# Patient Record
Sex: FEMALE | Race: WHITE | Employment: UNEMPLOYED | URBAN - METROPOLITAN AREA
[De-identification: names, ages, dates, MRNs, and addresses within clinical notes are randomized per-mention and may not be internally consistent; named-entity substitution may affect disease eponyms.]

---

## 2020-10-19 ENCOUNTER — HOSPITAL ENCOUNTER (OUTPATIENT)
Dept: GENERAL RADIOLOGY | Age: 50
Discharge: HOME | End: 2020-10-19

## 2020-10-19 ENCOUNTER — TRANSCRIBE ORDER (OUTPATIENT)
Dept: REGISTRATION | Age: 50
End: 2020-10-19

## 2020-10-19 DIAGNOSIS — M25.561 RIGHT KNEE PAIN: Primary | ICD-10-CM

## 2020-10-19 DIAGNOSIS — M25.561 RIGHT KNEE PAIN: ICD-10-CM

## 2020-10-19 PROCEDURE — 73560 X-RAY EXAM OF KNEE 1 OR 2: CPT

## 2020-10-19 PROCEDURE — 73565 X-RAY EXAM OF KNEES: CPT

## 2020-11-09 ENCOUNTER — HOSPITAL ENCOUNTER (OUTPATIENT)
Dept: PREADMISSION TESTING | Age: 50
Discharge: HOME | End: 2020-11-09
Payer: COMMERCIAL

## 2020-11-09 PROCEDURE — 87635 SARS-COV-2 COVID-19 AMP PRB: CPT

## 2020-11-10 LAB — SARS-COV-2, COV2NT: NOT DETECTED

## 2020-11-12 ENCOUNTER — ANESTHESIA EVENT (OUTPATIENT)
Dept: SURGERY | Age: 50
End: 2020-11-12
Payer: COMMERCIAL

## 2020-11-13 ENCOUNTER — HOSPITAL ENCOUNTER (OUTPATIENT)
Age: 50
Setting detail: OUTPATIENT SURGERY
Discharge: HOME | End: 2020-11-13
Attending: ORTHOPAEDIC SURGERY | Admitting: ORTHOPAEDIC SURGERY
Payer: COMMERCIAL

## 2020-11-13 ENCOUNTER — ANESTHESIA (OUTPATIENT)
Dept: SURGERY | Age: 50
End: 2020-11-13
Payer: COMMERCIAL

## 2020-11-13 VITALS
WEIGHT: 165.12 LBS | TEMPERATURE: 97.8 F | DIASTOLIC BLOOD PRESSURE: 72 MMHG | BODY MASS INDEX: 30.39 KG/M2 | SYSTOLIC BLOOD PRESSURE: 120 MMHG | HEART RATE: 57 BPM | HEIGHT: 62 IN | RESPIRATION RATE: 14 BRPM | OXYGEN SATURATION: 96 %

## 2020-11-13 LAB — HCG UR QL: NEGATIVE

## 2020-11-13 PROCEDURE — 74011250636 HC RX REV CODE- 250/636: Performed by: ORTHOPAEDIC SURGERY

## 2020-11-13 PROCEDURE — 76210000050 HC AMBSU PH II REC 0.5 TO 1 HR: Performed by: ORTHOPAEDIC SURGERY

## 2020-11-13 PROCEDURE — 77030018835 HC SOL IRR LR ICUM -A: Performed by: ORTHOPAEDIC SURGERY

## 2020-11-13 PROCEDURE — 77030040361 HC SLV COMPR DVT MDII -B

## 2020-11-13 PROCEDURE — 74011000250 HC RX REV CODE- 250: Performed by: ORTHOPAEDIC SURGERY

## 2020-11-13 PROCEDURE — 77030000032 HC CUF TRNQT ZIMM -B: Performed by: ORTHOPAEDIC SURGERY

## 2020-11-13 PROCEDURE — 76060000061 HC AMB SURG ANES 0.5 TO 1 HR: Performed by: ORTHOPAEDIC SURGERY

## 2020-11-13 PROCEDURE — 74011250636 HC RX REV CODE- 250/636: Performed by: ANESTHESIOLOGY

## 2020-11-13 PROCEDURE — 81025 URINE PREGNANCY TEST: CPT

## 2020-11-13 PROCEDURE — 77030008495 HC TBNG ARTHSC IRR CNMD -B: Performed by: ORTHOPAEDIC SURGERY

## 2020-11-13 PROCEDURE — 77030028907 HC WRP KNEE WO BGS SOLM -B

## 2020-11-13 PROCEDURE — 76030000000 HC AMB SURG OR TIME 0.5 TO 1: Performed by: ORTHOPAEDIC SURGERY

## 2020-11-13 PROCEDURE — 76210000034 HC AMBSU PH I REC 0.5 TO 1 HR: Performed by: ORTHOPAEDIC SURGERY

## 2020-11-13 PROCEDURE — 77030002916 HC SUT ETHLN J&J -A: Performed by: ORTHOPAEDIC SURGERY

## 2020-11-13 PROCEDURE — 77030020143 HC AIRWY LARYN INTUB CGAS -A: Performed by: ANESTHESIOLOGY

## 2020-11-13 PROCEDURE — 74011250636 HC RX REV CODE- 250/636: Performed by: NURSE ANESTHETIST, CERTIFIED REGISTERED

## 2020-11-13 PROCEDURE — 2709999900 HC NON-CHARGEABLE SUPPLY: Performed by: ORTHOPAEDIC SURGERY

## 2020-11-13 PROCEDURE — 77030040922 HC BLNKT HYPOTHRM STRY -A

## 2020-11-13 PROCEDURE — 77030006884 HC BLD SHV INCIS S&N -B: Performed by: ORTHOPAEDIC SURGERY

## 2020-11-13 RX ORDER — LIDOCAINE HYDROCHLORIDE 10 MG/ML
0.1 INJECTION, SOLUTION EPIDURAL; INFILTRATION; INTRACAUDAL; PERINEURAL AS NEEDED
Status: DISCONTINUED | OUTPATIENT
Start: 2020-11-13 | End: 2020-11-13 | Stop reason: HOSPADM

## 2020-11-13 RX ORDER — NALOXONE HYDROCHLORIDE 0.4 MG/ML
0.04 INJECTION, SOLUTION INTRAMUSCULAR; INTRAVENOUS; SUBCUTANEOUS
Status: DISCONTINUED | OUTPATIENT
Start: 2020-11-13 | End: 2020-11-13 | Stop reason: HOSPADM

## 2020-11-13 RX ORDER — FENTANYL CITRATE 50 UG/ML
25 INJECTION, SOLUTION INTRAMUSCULAR; INTRAVENOUS
Status: DISCONTINUED | OUTPATIENT
Start: 2020-11-13 | End: 2020-11-13 | Stop reason: HOSPADM

## 2020-11-13 RX ORDER — MIDAZOLAM HYDROCHLORIDE 1 MG/ML
INJECTION, SOLUTION INTRAMUSCULAR; INTRAVENOUS AS NEEDED
Status: DISCONTINUED | OUTPATIENT
Start: 2020-11-13 | End: 2020-11-13 | Stop reason: HOSPADM

## 2020-11-13 RX ORDER — PROPOFOL 10 MG/ML
INJECTION, EMULSION INTRAVENOUS AS NEEDED
Status: DISCONTINUED | OUTPATIENT
Start: 2020-11-13 | End: 2020-11-13 | Stop reason: HOSPADM

## 2020-11-13 RX ORDER — DEXAMETHASONE SODIUM PHOSPHATE 4 MG/ML
INJECTION, SOLUTION INTRA-ARTICULAR; INTRALESIONAL; INTRAMUSCULAR; INTRAVENOUS; SOFT TISSUE AS NEEDED
Status: DISCONTINUED | OUTPATIENT
Start: 2020-11-13 | End: 2020-11-13 | Stop reason: HOSPADM

## 2020-11-13 RX ORDER — HYDROMORPHONE HYDROCHLORIDE 1 MG/ML
.25-1 INJECTION, SOLUTION INTRAMUSCULAR; INTRAVENOUS; SUBCUTANEOUS
Status: DISCONTINUED | OUTPATIENT
Start: 2020-11-13 | End: 2020-11-13 | Stop reason: HOSPADM

## 2020-11-13 RX ORDER — SODIUM CHLORIDE 0.9 % (FLUSH) 0.9 %
5-40 SYRINGE (ML) INJECTION EVERY 8 HOURS
Status: DISCONTINUED | OUTPATIENT
Start: 2020-11-13 | End: 2020-11-13 | Stop reason: HOSPADM

## 2020-11-13 RX ORDER — SODIUM CHLORIDE, SODIUM LACTATE, POTASSIUM CHLORIDE, CALCIUM CHLORIDE 600; 310; 30; 20 MG/100ML; MG/100ML; MG/100ML; MG/100ML
125 INJECTION, SOLUTION INTRAVENOUS CONTINUOUS
Status: DISCONTINUED | OUTPATIENT
Start: 2020-11-13 | End: 2020-11-13 | Stop reason: HOSPADM

## 2020-11-13 RX ORDER — KETOROLAC TROMETHAMINE 30 MG/ML
INJECTION, SOLUTION INTRAMUSCULAR; INTRAVENOUS AS NEEDED
Status: DISCONTINUED | OUTPATIENT
Start: 2020-11-13 | End: 2020-11-13 | Stop reason: HOSPADM

## 2020-11-13 RX ORDER — FLUMAZENIL 0.1 MG/ML
0.2 INJECTION INTRAVENOUS
Status: DISCONTINUED | OUTPATIENT
Start: 2020-11-13 | End: 2020-11-13 | Stop reason: HOSPADM

## 2020-11-13 RX ORDER — SODIUM CHLORIDE 0.9 % (FLUSH) 0.9 %
5-40 SYRINGE (ML) INJECTION AS NEEDED
Status: DISCONTINUED | OUTPATIENT
Start: 2020-11-13 | End: 2020-11-13 | Stop reason: HOSPADM

## 2020-11-13 RX ORDER — ALBUTEROL SULFATE 0.83 MG/ML
2.5 SOLUTION RESPIRATORY (INHALATION) AS NEEDED
Status: DISCONTINUED | OUTPATIENT
Start: 2020-11-13 | End: 2020-11-13 | Stop reason: HOSPADM

## 2020-11-13 RX ORDER — BISMUTH SUBSALICYLATE 262 MG
1 TABLET,CHEWABLE ORAL DAILY
COMMUNITY

## 2020-11-13 RX ORDER — ONDANSETRON 2 MG/ML
INJECTION INTRAMUSCULAR; INTRAVENOUS AS NEEDED
Status: DISCONTINUED | OUTPATIENT
Start: 2020-11-13 | End: 2020-11-13 | Stop reason: HOSPADM

## 2020-11-13 RX ORDER — FENTANYL CITRATE 50 UG/ML
INJECTION, SOLUTION INTRAMUSCULAR; INTRAVENOUS AS NEEDED
Status: DISCONTINUED | OUTPATIENT
Start: 2020-11-13 | End: 2020-11-13 | Stop reason: HOSPADM

## 2020-11-13 RX ORDER — DIPHENHYDRAMINE HYDROCHLORIDE 50 MG/ML
12.5 INJECTION, SOLUTION INTRAMUSCULAR; INTRAVENOUS AS NEEDED
Status: DISCONTINUED | OUTPATIENT
Start: 2020-11-13 | End: 2020-11-13 | Stop reason: HOSPADM

## 2020-11-13 RX ORDER — BUPIVACAINE HYDROCHLORIDE AND EPINEPHRINE 5; 5 MG/ML; UG/ML
INJECTION, SOLUTION EPIDURAL; INTRACAUDAL; PERINEURAL AS NEEDED
Status: DISCONTINUED | OUTPATIENT
Start: 2020-11-13 | End: 2020-11-13 | Stop reason: HOSPADM

## 2020-11-13 RX ORDER — ONDANSETRON 2 MG/ML
4 INJECTION INTRAMUSCULAR; INTRAVENOUS AS NEEDED
Status: DISCONTINUED | OUTPATIENT
Start: 2020-11-13 | End: 2020-11-13 | Stop reason: HOSPADM

## 2020-11-13 RX ADMIN — MIDAZOLAM HYDROCHLORIDE 2 MG: 2 INJECTION, SOLUTION INTRAMUSCULAR; INTRAVENOUS at 09:15

## 2020-11-13 RX ADMIN — FENTANYL CITRATE 50 MCG: 0.05 INJECTION, SOLUTION INTRAMUSCULAR; INTRAVENOUS at 09:40

## 2020-11-13 RX ADMIN — ONDANSETRON HYDROCHLORIDE 4 MG: 2 SOLUTION INTRAMUSCULAR; INTRAVENOUS at 09:43

## 2020-11-13 RX ADMIN — PROPOFOL 30 MG: 10 INJECTION, EMULSION INTRAVENOUS at 09:40

## 2020-11-13 RX ADMIN — HYDROMORPHONE HYDROCHLORIDE 0.5 MG: 1 INJECTION, SOLUTION INTRAMUSCULAR; INTRAVENOUS; SUBCUTANEOUS at 11:08

## 2020-11-13 RX ADMIN — PROPOFOL 20 MG: 10 INJECTION, EMULSION INTRAVENOUS at 09:54

## 2020-11-13 RX ADMIN — CEFAZOLIN SODIUM 2 G: 1 POWDER, FOR SOLUTION INTRAMUSCULAR; INTRAVENOUS at 09:30

## 2020-11-13 RX ADMIN — KETOROLAC TROMETHAMINE 30 MG: 30 INJECTION INTRAMUSCULAR; INTRAVENOUS at 09:50

## 2020-11-13 RX ADMIN — FENTANYL CITRATE 50 MCG: 0.05 INJECTION, SOLUTION INTRAMUSCULAR; INTRAVENOUS at 09:21

## 2020-11-13 RX ADMIN — PROPOFOL 150 MG: 10 INJECTION, EMULSION INTRAVENOUS at 09:22

## 2020-11-13 RX ADMIN — FENTANYL CITRATE 50 MCG: 0.05 INJECTION, SOLUTION INTRAMUSCULAR; INTRAVENOUS at 09:46

## 2020-11-13 RX ADMIN — SODIUM CHLORIDE, SODIUM LACTATE, POTASSIUM CHLORIDE, AND CALCIUM CHLORIDE 125 ML/HR: 600; 310; 30; 20 INJECTION, SOLUTION INTRAVENOUS at 08:37

## 2020-11-13 RX ADMIN — DEXAMETHASONE SODIUM PHOSPHATE 4 MG: 4 INJECTION, SOLUTION INTRAMUSCULAR; INTRAVENOUS at 09:27

## 2020-11-13 NOTE — OP NOTES
[facility] 81  OPERATIVE REPORT    Name:  Salvador Hernadez  MR#:  [id number]  :  1970  ACCOUNT #:  [de-identified]  DATE OF SERVICE:  2020    PREOPERATIVE DIAGNOSES:  1. Right knee lateral meniscus tear. 2.  Right knee chondromalacia. POSTOPERATIVE DIAGNOSES:  1. Right knee lateral meniscus tear. 2.  Right knee chondromalacia. PROCEDURE PERFORMED:  Right knee arthroscopic partial lateral meniscectomy. SURGEON:  Loagn Fairchild MD    ASSISTANTClniya Mention. ANESTHESIA:  General.    COMPLICATIONS:  None. SPECIMENS REMOVED:  None. IMPLANTS:  None. ESTIMATED BLOOD LOSS:  None. DRAINS:  None. PROCEDURE:  The patient was brought to the operating room and given general anesthesia in a supine position on the OR table. A tourniquet and soft roll were applied to the right thigh. The extremity was exsanguinated with an Esmarch and the tourniquet was inflated. The leg was placed into a leg garduno. The contralateral extremity was appropriately positioned, protected, and secured. The right leg was prepped and draped in a sterile fashion with ChloraPrep. A time out was performed. Preoperative prophylactic antibiotic infusion was confirmed. Standard anteromedial and anterolateral portals were created and the knee was examined systematically. The patellofemoral compartment had grade II chondromalacia of the lateral facet of the patella with a central area of trochlear chondromalacia that was grade III and very narrow segment along the central trochlea. The suprapatellar pouch had some hyperemic synovitis. The medial and lateral gutters were normal.  Medial compartment with intact meniscus and cartilage. Lateral compartment with an area of tearing of the anterior horn of the lateral meniscus and grade II chondromalacia of the lateral tibial plateau.   A chondroplasty was performed with a shaver and a partial lateral meniscectomy, removing the tear of the anterior horn.  This was balanced to the body. The remaining meniscus was stable to probing. A chondroplasty was performed along the trochlea and patella from both medial and lateral working portals to remove the unstable articular cartilage. A synovectomy was performed to remove the hyperemic synovitis in the suprapatellar pouch, medial and lateral gutters. The joint was thoroughly irrigated. There were no loose bodies. The portals were closed with nylon. The knee was injected with Marcaine. Sterile dressing was applied. The patient was awakened and returned to recovery in stable condition. Family was notified of her condition.       Chapito Bardales MD      [id number]  D:  11/13/2020 9:57  T:  11/13/2020 14:59  JOB #:  [id number]

## 2020-11-13 NOTE — BRIEF OP NOTE
Brief Postoperative Note    Patient: Avis Joe  YOB: 1970  MRN: [id number]    Date of Procedure: 11/13/2020     Pre-Op Diagnosis: CHONDROMALACIA, RIGHT KNEE, OTHER TEAR OF LATERAL MENISCUS, CURRENT INJURY RIGHT KNEE    Post-Op Diagnosis: Same as preoperative diagnosis.       Procedure(s):  RIGHT KNEE ARTHROSCOPIC PARTIAL LATERAL MENISCECTOMY AND CHONDROPLASTY    Surgeon(s):  Torrey Meneses MD    Surgical Assistant: Surg Asst-1: Danelle MARTINI    Anesthesia: General     Estimated Blood Loss (mL): Minimal    Complications: None    Specimens: * No specimens in log *     Implants: * No implants in log *    Drains: * No LDAs found *    Findings:  CHONDROMALACIA, RIGHT KNEE, OTHER TEAR OF LATERAL MENISCUS, CURRENT INJURY RIGHT KNEE    Electronically Signed by Yokasta Gill MD on 11/13/2020 at 9:54 AM

## 2020-11-13 NOTE — PERIOP NOTES
POST ANESTHESIA CARE    DISCHARGE / TRANSFER NOTE    Siddhartha Brandon was   discharged    via wheel chair     to    private vehicle    . Patient was escorted by   nurse    . Patient verbalized   appreciation and was very pleased with care received  throughout their stay. Patient was discharged in     pleasant mood   Pain at discharge/transfer was      2-3  /10. Discharge, medication and follow-up instructions were verbalized as understood prior to discharge  (if applicable for same-day procedures being discharged.)    All personal belongings have been returned to patient, and patient/family verbally confirm receiving belongings as all present.     Signed: Haleigh GONZALEZN RN-BC

## 2020-11-13 NOTE — DISCHARGE INSTRUCTIONS
SEE DR Nahomy Rios PRE-PRINTED INSTRUCTIONS  (COPY MADE TO PAPER CHART RECORD)    ___________________________________________        DISCHARGE SUMMARY from Your Nurse    PATIENT INSTRUCTIONS:    AFTER ANESTHESIA & SEDATION, and WHILE TAKING PAIN MEDICINE  After general anesthesia / intravenous sedation and the 24 hours following, and/or while taking prescription Opiates:  · Limit your activities  · Do not drive and operate hazardous machinery until you have been of all narcotics and sedatives for over 24 hours  · Do not make important personal or business decisions  · Do  not drink alcoholic beverages  · If you have not urinated within 8 hours after discharge, please contact your surgeon on call. SIGNS OF INFECTION, THINGS TO REPORT TO YOUR DOCTOR  Report the following Signs of Infection or General Problems after surgery to your surgeon:  · Excessive pain, swelling, redness, drainage, pus or odor of or around the surgical area  · Fever/ temperature over 101; Temperature over 100 if on medications (chemotherapy or medicines which affect your ability to fight infections)  · Nausea and vomiting lasting longer than 4 hours or if unable to take medications  · Any signs of decreased circulation or nerve impairment to extremity: change in color, persistent  numbness, tingling, coldness or increase pain  · If you have any questions. GOOD HELP TO FIGHT AN INFECTION  Here are a few tip to help reduce the chance of getting an infection after surgery:   Wash Your Hands   Good handwashing is the most important thing you and your caregiver can do.  Wash before and after caring for any wounds. Dry your hand with a clean towel.  Wash with soap and water for at least 20 seconds. A TIP: sing the \"Happy Birthday\" song through one time while washing to help with the timing.  Use a hand  in between washings.      Shower   When your surgeon says it is OK to take a shower, use a new bar of antibacterial soap (if that is what you use, and keep that bar of soap ONLY for your use), or antibacterial body wash.  Use a clean wash cloth or sponge when you bathe.  Dry off with a clean towel  after every bath - be careful around any wounds, skin staples, sutures or surgical glue over/on wounds.  Do not enter swimming pools, hot tubs, lakes, rivers and/or ocean until wounds are healed and your doctor/surgeon says it is OK.  Use Clean Sheets   Sleep on freshly laundered sheets after your surgery.  Keep the surgery site covered with a clean, dry bandage (if instructed to do so). If the bandage becomes soiled, reapply a new, dry, clean bandage.  Do not allow pets to sleep with you while your wound is healing.  Lifestyle Modification and Controlling Your Blood Sugar   Smoking slows wound healing. Stop smoking and limit exposure to second-hand smoke.  High blood sugar slows wound healing. Eat a well-balanced diet to provide proper nutrition while healing   Monitor your blood sugar (if you are a diabetic) and take your medications as you are suppose to so you can control you blood sugar after surgery. COUGH AND DEEP BREATHE  Breathing deep and coughing are very important exercises to do after surgery. Deep breathing and coughing open the little air tubes and air sacks in your lungs. You take deep breaths every day. You may not even notice - it is just something you do when you sigh or yawn. It is a natural exercise you do to keep these air passages open. After surgery, take deep breaths and cough, on purpose. Coughing and deep breathing help prevent bronchitis and pneumonia after surgery. If you had chest or belly surgery, use a pillow as a \"hug omayra\" and hold it tightly to your chest or belly when you cough. DIRECTIONS:  1. Take 10 to 15 slow deep breaths every hour while awake. 2. Breathe in deeply, and hold it for 2 seconds.   3. Exhale slowly through puckered lips, like blowing up a balloon. 4. After every 4th or 5th deep breath, hug your pillow to your chest or belly and give a hard, deep cough. Yes, it will probably hurt if you had abdominal surgery. But doing this exercise is very important part of healing after surgery. Take your pain medicine to help you do this exercise without too much pain. ANKLE PUMPS    Ankle pumps increase the circulation of oxygenated blood to your lower extremities and decrease your risk for circulation problems such as blood clots. They also stretch the muscles, tendons and ligaments in your foot and ankle, and prevent joint contracture in the ankle and foot, especially after surgeries on the legs. It is important to do ankle pump exercises regularly after surgery because immobility increases your risk for developing a blood clot. Your doctor may also have you take an Aspirin for the next few days as well. If your doctor did not ask you to take an Aspirin, consult with him before starting Aspirin therapy on your own. The exercise is quite simple. · Slowly point your foot forward, feeling the muscles on the top of your lower leg stretch, and hold this position for 5 seconds. · Next, pull your foot back toward you as far as possible, stretching the calf muscles, and hold that position for 5 seconds. · Repeat with the other foot. · Perform 10 repetitions every hour while awake for both ankles if possible (down and then up with the foot once is one repetition). You should feel gentle stretching of the muscles in your lower leg when doing this exercise. If you feel pain, or your range of motion is limited, don't push too hard. Only go the limit your joint and muscles will let you go. If you have increasing pain, progressively worsening leg warmth or swelling, STOP the exercise and call your doctor. Other Wound Care information:  [] No additional recommendations.     Cold Therapy Instructions    Your nurse will provide a cold therapy wrap based upon your surgery, need, and your doctor's orders. INSTRUCTIONS FOR USE:  All cooling wraps produce sustained periods of intense cold. NEVER PLACE PAD ON BARE SKIN OR HAVE CONTACT WITH BARE SKIN  Always Use An Insulating Barrier. Tissue injury can occur if these devices are used improperly. Follow your surgeons instructions carefully regarding the frequency, duration and breaks from cold therapy, and the total length of treatment. Check under the pad barrier every 2 hours for skin injury (see below.)      SIGNS OF SKIN INJURY:  STOP USE AND CONTACT YOUR SURGEON if any of the following occur: Increased pain, burning, increased swelling, itching, blisters, increased redness, discoloration, welts, or other change in skin condition. INDICATIONS:  Back, Hip, Knee or Shoulder Surgery and Post-Operative Treatment; Trauma; Orthopedic Rehabilitation      CONTRAINDICATIONS:  Cold therapy should not be used by persons with Diabetes, Raynaud's or other vasospastic disease, cold hypersensititvity, or compromised local circulation. Certain medical conditions make cold-induced injury more likely. Please consult with your healthcare provider before use. Below is information on the medication(s) your doctor is prescribing for you: The maximum daily dose of acetaminophen was discussed with the patient. She was encouraged not to exceed 3,000 mg of acetaminophen during a 24 hour period and was asked to keep in mind that acetaminophen can also be found in many over-the-counter cold medications as well as narcotics that may be given for pain. The patient expresses understanding of these issues and questions were answered. 4 THINGS ABOUT PAIN MEDICINE I ALWAYS TALK ABOUT:  There are 4 side effects I always talk about for pain medications. 1. They make you sleepy and drowsy.   Do not drive a car or operate machines while taking pain medication. Do not make any major decisions. Take a nap. Relax. Let your body recover from the affects of anesthesia and surgery. 2. Some people have quite a problem with itching and. ..  3. Nausea or vomiting. I mention these together because research tends to suggest there is a gene-related issue. While some have a hard time with these problems, others do not. These are expected and know side effects. Over-the-counter Benadryl® (the drug name is diphenhydramine) can help with the itching. Your doctor may also have given you some medicine for nausea. IF HE DID NOT, CALL HIM/HER. 4. Last but not least is the problem of constipation (not joe able to have a bowel movement - poop.)  All pain medicine can slow down the movement of food through the gut. The slower it goes, the worse it can be. Frankly, this means hard poop adding insult to the injury of surgery. And if you had tummy surgery, like having your gall bladder removed or a hernia repair, YOU DO NOT WANT THIS PROBLEM. There are 4 things I recommend. · Drink lots of fluids. For healthy people with no heart problems, this means at least 64 ounces of liquids or more per day. For example, a Big Gulp® from [employer] is 32 ounces. So you need to drink at least 2  Big Gulp®'s of fluids every day. If you have heart problems you may not be able to do this. Talk to your doctor about what you should do to prevent constipation. · Drinking fruit juice like apple, pear, or prune juice gives you extra \"BANG\" for your beverage. These drinks are high in natural fiber. If you are a diabetic, drink sugar-free fluids with fiber additives (see next 2 points.)  Avoid drinking extra fruit juice unless this is a regular part of your diet plan. · Eat extra fresh fruits and vegetables. · Add extra fiber-products. Fiber products like Metamucil®, Citrucel®, Miralax® or Benefiber® can help.   These products are over-the-counter and you do not need a prescription from your doctor. If you have followed these recommendations and still have some difficulty having a good poop, take and over-the-counter stimulant like Dulcolax® (biscodyl)  or Senokot® (senna concentrate). These may help get things moving. Bro Romero MEDICATION AND   SIDE EFFECT GUIDE    The [facility] [employer] MEDICATION AND SIDE EFFECT GUIDE was provided to the PATIENT AND CARE PROVIDER. Information provided includes instruction about drug purpose and common side effects for the following medications:   · Acetaminophen  · Tramadol  · Zofran    Medication information added to discharge record on November 13, 2020 at 10:24 AM.      Some information we wish all of our patients to be familiar with and General Information for Healthy Lifestyle choices:    · Make a list of your current medications with your Primary Care Provider. · Update this list whenever your medications are discontinued, doses are changed, or new medications (including over-the-counter products like ibuprofen, vitamins, or herbal remedies) are added. · Carry medication information at all times in case of emergency situations      No smoking / No tobacco products / Avoid exposure to second hand smoke    Surgeon General's Warning:  Quitting smoking now greatly reduces serious risk to your health. Obesity, smoking, and sedentary lifestyle greatly increases your risk for illness. A healthy diet, regular physical exercise & weight monitoring are important for maintaining a healthy lifestyle. A Note About Congestive Heart Failure:   You may be retaining fluid if you have a history of heart failure or if you experience any of the following symptoms:      · Weight gain of 3 pounds or more overnight or 5 pounds in a week  · Increased swelling in our hands or feet  · Shortness of breath while lying flat in bed      Please call your doctor as soon as you notice any of these symptoms; do not wait until your next office visit. A Note About Strokes:  Recognize signs and symptoms of STROKE. The simple mnemonic, F.A.S.T., can help you remember signs of a stroke and what to do if you suspect a stroke is occuring to you or someone you are with:    F - Face looks uneven  A - Arms unable to move, or move evenly  S - Speech is slurred or non-existent  T - Time - CALL 911 as soon as signs and symptoms begin - DO NOT go to bed or wait to see if you get better - TIME IS BRAIN. Warning Signs of HEART ATTACK   Call 911 if you have these symptoms:     Chest discomfort. Most heart attacks involve discomfort in the center of the chest that lasts more than a few minutes, or that goes away and comes back. It can feel like uncomfortable pressure, squeezing, fullness, or pain.  Discomfort in other areas of the upper body. Symptoms can include pain or discomfort in one or both arms, the back, neck, jaw, or stomach.  Shortness of breath with or without chest discomfort.  Other signs may include breaking out in a cold sweat, nausea, or lightheadedness. Don't wait more than five minutes to call 911 - MINUTES MATTER! Fast action can save your life. Calling 911 is almost always the fastest way to get lifesaving treatment. Emergency Medical Services staff can begin treatment when they arrive -- up to an hour sooner than if someone gets to the hospital by car. Learning About Coronavirus [phone])  Coronavirus [phone]): Overview  What is coronavirus (COVID-19)? The coronavirus disease (COVID-19) is caused by a virus. It is an illness that was first found in [place], [place], in December 2019. It has since spread worldwide. The virus can cause fever, cough, and trouble breathing. In severe cases, it can cause pneumonia and make it hard to breathe without help. It can cause death. Coronaviruses are a large group of viruses. They cause the common cold.  They also cause more serious illnesses like Middle East respiratory syndrome (MERS) and severe acute respiratory syndrome (SARS). COVID-19 is caused by a novel coronavirus. That means it's a new type that has not been seen in people before. This virus spreads person-to-person through droplets from coughing and sneezing. It can also spread when you are close to someone who is infected. And it can spread when you touch something that has the virus on it, such as a doorknob or a tabletop. What can you do to protect yourself from coronavirus (COVID-19)? The best way to protect yourself from getting sick is to:  · Avoid areas where there is an outbreak. · Avoid contact with people who may be infected. · Wash your hands often with soap or alcohol-based hand sanitizers. · Avoid crowds and try to stay at least 6 feet away from other people. · Wash your hands often, especially after you cough or sneeze. Use soap and water, and scrub for at least 20 seconds. If soap and water aren't available, use an alcohol-based hand . · Avoid touching your mouth, nose, and eyes. What can you do to avoid spreading the virus to others? To help avoid spreading the virus to others:  · Cover your mouth with a tissue when you cough or sneeze. Then throw the tissue in the trash. · Use a disinfectant to clean things that you touch often. · Stay home if you are sick or have been exposed to the virus. Don't go to school, work, or public areas. And don't use public transportation. · If you are sick:  ? Leave your home only if you need to get medical care. But call the doctor's office first so they know you're coming. And wear a face mask, if you have one.  ? If you have a face mask, wear it whenever you're around other people. It can help stop the spread of the virus when you cough or sneeze. ? Clean and disinfect your home every day. Use household  and disinfectant wipes or sprays. Take special care to clean things that you grab with your hands.  These include doorknobs, remote controls, phones, and handles on your refrigerator and microwave. And don't forget countertops, tabletops, bathrooms, and computer keyboards. When to call for help  Call 911 anytime you think you may need emergency care. For example, call if:  · You have severe trouble breathing. (You can't talk at all.)  · You have constant chest pain or pressure. · You are severely dizzy or lightheaded. · You are confused or can't think clearly. · Your face and lips have a blue color. · You pass out (lose consciousness) or are very hard to wake up. Call your doctor now if you develop symptoms such as:  · Shortness of breath. · Fever. · Cough. If you need to get care, call ahead to the doctor's office for instructions before you go. Make sure you wear a face mask, if you have one, to prevent exposing other people to the virus. Where can you get the latest information? The following health organizations are tracking and studying this virus. Their websites contain the most up-to-date information. Khadra Hunter also learn what to do if you think you may have been exposed to the virus. · U.[place]. Centers for Disease Control and Prevention (CDC): The CDC provides updated news about the disease and travel advice. The website also tells you how to prevent the spread of infection. www.cdc.gov  · World Health Organization [facility]): WHO offers information about the virus outbreaks. WHO also has travel advice. www.who.int  Current as of: April 1, 2020               Content Version: 12.4  © 2006-2020 [employer]. Care instructions adapted under license by your healthcare professional. If you have questions about a medical condition or this instruction, always ask your healthcare professional. [place] [address] any warranty or liability for your use of this information. AT THE COMPLETION OF DISCHARGE INSTRUCTION REVIEW, WE VERIFY:  The discharge information has been reviewed with the patient and caregiver.     Questions have been asked and answered meeting patient and caregiver expectations. The patient and caregiver verbalized understanding. Your discharge nurse was Warren Rudolph RN-BC       Board Certified - Pain Management      CONTENTS FOUND IN YOUR DISCHARGE ENVELOPE:  [x]     Surgeon and Hospital Discharge Instructions  [x]     [facility] Care Provider Card  [x]     Medication & Side Effect Guide            (your newly prescribed medications have been marked/highlighted showing the most common side effects from the classes of drugs on your prescriptions)  [x]     Medication Prescription(s) x 3 ( [x] These have been sent electronically to your pharmacy by your surgeon,   - OR -       your surgeon has already provided these to you during a previous/pre-op office visit)  []     Physical Therapy Prescription  []     Follow-up Appointment Cards  [x]     Surgery-related Pictures/Media  []     Pain block and/or block with On-Q Catheter from Anesthesia Service (information included in your instructions above)  [x]     Medical device information sheets/pamphlets from their    []     School/work excuse note. []     /parent work excuse note. The following personal items collected during your admission for safe keeping are returned to you:     Dental Appliance: Dental Appliances: None  Vi kimberly:    Hearing Aid:    Jewelry: Jewelry: None  Clothing: Clothing: Undergarments, Footwear, Pants, Shirt  Other Valuables:  Other Valuables: None  Valuables sent to safe:

## 2020-11-13 NOTE — PERIOP NOTES
PACU IN REPORT FROM ANESTHESIA    Verbal report received from   Shae Starr   [] MD/DO-Anesthesiologist    [x] CRNA   [] with student    CHOICE ANESTHESIA:  [x] GENERAL  [] MAC  [] LOCAL  [] REGIONAL  [] SPINAL   [] EPIDURAL   **Note the anesthesia record for medications given intraoperatively. **           [] E.R.A.S. PROTOCOL    SURGICAL PROCEDURE: Procedure(s) (LRB):  RIGHT KNEE ARTHROSCOPIC PARTIAL LATERAL MENISCECTOMY AND CHONDROPLASTY (Right)     SURGEON: Eloy King MD.    Brief Initial Visual Assessment:    Patient Age: [] Infant(1-12mo)      []Pediatric(1-13yrs)    [] Adolescent(13-18yrs)    [x] Adult(18-65yrs)      []Geriatric Adult(>65yrs). Patient    [] Alert           [x]Calm & Cooperative      [] Anxious  Appearance: [x] Drowsy      [] Sedated      [] Unresponsive     Oriented x  1            Airway:     [x] Patent                          [] Near-obstructed   [] Obstructed                        [] Airway improved with head/airway repositioning                       Airway Adjuncts Present: [] Oral Airway    [] Nasal Trumpet    [] ETT    [] LMA            Respiratory  [x] Even   [] Labored   [] Shallow   [] Tachypnea   [] Bradypnea  Pattern:    [x] Non-Labored  [] VENT and/or respiratory assistance     being provided. Skin:     [x] Pink [] Dusky    [] Pale        [x] Warm    [] Hot [] Cool       [] Cold   [x]Dry [] Moist [] Diaphoretic     Membranes:  [x] Pink [] Pale       [x] Moist [] Dry     [] Crusty     Pain:   [x] No Acute Discomfort. 0  /10 Scale [] Verbal Numeric   [] Moderate Discomfort.      [] V.A.S. [] Acute Discomfort. [x] A.N.V.    [] Chronic-Issue Related Discomfort.   [] F.L.A.C.C. Note E-MAR for medications administered. []Faces, [place]/Baker    Note assessments documented in flowsheets; any assessment variants to be found in comments or narrative perioperative nurse notes.        Post-anesthesia care now assumed by [facility] BSN, RN-BC

## 2020-11-13 NOTE — ANESTHESIA POSTPROCEDURE EVALUATION
Procedure(s):  RIGHT KNEE ARTHROSCOPIC PARTIAL LATERAL MENISCECTOMY AND CHONDROPLASTY. general    Anesthesia Post Evaluation      Multimodal analgesia: multimodal analgesia not used between 6 hours prior to anesthesia start to PACU discharge  Patient location during evaluation: PACU  Patient participation: complete - patient participated  Level of consciousness: awake  Pain management: adequate  Airway patency: patent  Anesthetic complications: no  Cardiovascular status: acceptable, blood pressure returned to baseline and hemodynamically stable  Respiratory status: acceptable  Hydration status: acceptable  Post anesthesia nausea and vomiting:  controlled      INITIAL Post-op Vital signs:   Vitals Value Taken Time   /71 11/13/2020 10:35 AM   Temp 36.6 °C (97.8 °F) 11/13/2020 10:09 AM   Pulse 51 11/13/2020 10:40 AM   Resp 13 11/13/2020 10:40 AM   SpO2 100 % 11/13/2020 10:40 AM   Vitals shown include unvalidated device data.

## (undated) DEVICE — PAD,ABDOMINAL,5"X9",ST,LF,25/BX: Brand: MEDLINE INDUSTRIES, INC.

## (undated) DEVICE — COVER LT HNDL PLAS RIG 1 PER PK

## (undated) DEVICE — PREP SKN CHLRAPRP APL 26ML STR --

## (undated) DEVICE — SPONGE GZ W4XL4IN COT 12 PLY TYP VII WVN C FLD DSGN

## (undated) DEVICE — STERILE POLYISOPRENE POWDER-FREE SURGICAL GLOVES: Brand: PROTEXIS

## (undated) DEVICE — TUBING, SUCTION, 1/4" X 10', STRAIGHT: Brand: MEDLINE

## (undated) DEVICE — 4.5 MM INCISOR PLUS STRAIGHT                                    BLADES, POWER/EP-1, VIOLET, PACKAGED                                    6 PER BOX, STERILE

## (undated) DEVICE — DRESSING,GAUZE,XEROFORM,CURAD,1"X8",ST: Brand: CURAD

## (undated) DEVICE — SOLUTION IRRIG 3000ML LAC R FLX CONT

## (undated) DEVICE — DRAPE,EXTREMITY,89X128,STERILE: Brand: MEDLINE

## (undated) DEVICE — 4-PORT MANIFOLD: Brand: NEPTUNE 2

## (undated) DEVICE — INFECTION CONTROL KIT SYS

## (undated) DEVICE — ZIMMER® STERILE DISPOSABLE TOURNIQUET CUFF WITH PROTECTIVE SLEEVE AND PLC, DUAL PORT, SINGLE BLADDER, 34 IN. (86 CM)

## (undated) DEVICE — STRAP,POSITIONING,KNEE/BODY,FOAM,4X60": Brand: MEDLINE

## (undated) DEVICE — DEVICE TRNSF SPIK STL 2008S] MICROTEK MEDICAL INC]

## (undated) DEVICE — 10K/24K ARTHROSCOPY INFLOW TUBE SET: Brand: 10K/24K

## (undated) DEVICE — ASTOUND STANDARD SURGICAL GOWN, XXL: Brand: CONVERTORS

## (undated) DEVICE — SUT ETHLN 3-0 18IN PS2 BLK --

## (undated) DEVICE — ARTHROSCOPY RICHMOND-LF: Brand: MEDLINE INDUSTRIES, INC.